# Patient Record
Sex: FEMALE | Race: WHITE | NOT HISPANIC OR LATINO | Employment: UNEMPLOYED | URBAN - METROPOLITAN AREA
[De-identification: names, ages, dates, MRNs, and addresses within clinical notes are randomized per-mention and may not be internally consistent; named-entity substitution may affect disease eponyms.]

---

## 2021-10-18 PROBLEM — M51.369 DEGENERATION OF LUMBAR INTERVERTEBRAL DISC: Status: ACTIVE | Noted: 2021-10-18

## 2024-08-05 ENCOUNTER — PREP FOR SURGERY (OUTPATIENT)
Dept: OTHER | Facility: HOSPITAL | Age: 44
End: 2024-08-05
Payer: COMMERCIAL

## 2024-08-05 ENCOUNTER — OFFICE VISIT (OUTPATIENT)
Dept: ORTHOPEDIC SURGERY | Facility: CLINIC | Age: 44
End: 2024-08-05
Payer: COMMERCIAL

## 2024-08-05 VITALS
DIASTOLIC BLOOD PRESSURE: 83 MMHG | HEART RATE: 95 BPM | WEIGHT: 150 LBS | HEIGHT: 64 IN | OXYGEN SATURATION: 97 % | BODY MASS INDEX: 25.61 KG/M2 | SYSTOLIC BLOOD PRESSURE: 127 MMHG

## 2024-08-05 DIAGNOSIS — M67.40 GANGLION CYST: ICD-10-CM

## 2024-08-05 DIAGNOSIS — M25.531 RIGHT WRIST PAIN: Primary | ICD-10-CM

## 2024-08-05 PROBLEM — M65.839 STENOSING TENOSYNOVITIS OF WRIST: Status: ACTIVE | Noted: 2024-08-05

## 2024-08-05 PROCEDURE — 99204 OFFICE O/P NEW MOD 45 MIN: CPT | Performed by: ORTHOPAEDIC SURGERY

## 2024-08-05 RX ORDER — ALBUTEROL SULFATE AND BUDESONIDE 90; 80 UG/1; UG/1
AEROSOL, METERED RESPIRATORY (INHALATION)
COMMUNITY
Start: 2024-05-15

## 2024-08-05 RX ORDER — CELECOXIB 100 MG/1
CAPSULE ORAL
COMMUNITY
Start: 2024-08-04

## 2024-08-05 NOTE — PROGRESS NOTES
"Chief Complaint  Initial Evaluation of the Right Wrist     Subjective      Shahida JIMENEZ presents to [facility] for initial evaluation of the right wrist.  She has a cyst on the right wrist.  She had no injury.  She has had the mass for 3 months.  She has pain with pronation and supination.  She had no injury or fall.      Allergies   Allergen Reactions    Morphine Itching     Widespread pruritis    Penicillins Other (See Comments)     Yeast infection         Social History     Socioeconomic History    Marital status: Legally    Tobacco Use    Smoking status: Every Day     Current packs/day: 1.00     Types: Cigarettes    Smokeless tobacco: Never    Tobacco comments:     vape   Vaping Use    Vaping status: Former   Substance and Sexual Activity    Alcohol use: Never    Drug use: Never    Sexual activity: Defer        I reviewed the patient's chief complaint, history of present illness, review of systems, past medical history, surgical history, family history, social history, medications, and allergy list.     Review of Systems     Constitutional: Denies fevers, chills, weight loss  Cardiovascular: Denies chest pain, shortness of breath  Skin: Denies rashes, acute skin changes  Neurologic: Denies headache, loss of consciousness      Vital Signs:   /83   Pulse 95   Ht 162.6 cm (64\")   Wt 68 kg (150 lb)   SpO2 97%   BMI 25.75 kg/m²          Physical Exam  General: Alert. No acute distress    Ortho Exam        RIGHT WRIST Negative Compression testing/ Negative Tinels. NegativeFinkelsteins. Positive Ortiz's testing. Negative CMC grind testing. Negative Phalens. Full ROM of the hand, fingers, elbow and wrist. Negative Triggering of the digit. Sensation grossly intact to light touch, median, radial and ulnar nerve. Positive AIN, PIN and ulnar nerve motor function intact. Axillary nerve intact. Positive pulses.  Ganglion cyst on the wrist.       Procedures        Imaging " Results (Most Recent)       None             Result Review :             Assessment and Plan     Diagnoses and all orders for this visit:    1. Right wrist pain (Primary)    2. Ganglion cyst right wrist        Discussed the treatment plan with the patient.     Discussed the treatment options with the patient, operative vs non-operative. The patient expressed understanding and wished to proceed with an excision of the ganglion cyst.        Educated on risk of smoking/nicotine. Discussed options for smoking cessation., Discussed surgery., Risks/benefits discussed with patient including, but not limited to: infection, bleeding, neurovascular damage, re-rupture, aesthetic deformity, need for further surgery, and death., Surgery pamphlet given., and Call or return if worsening symptoms.    Follow Up     2 weeks postoperatively       Patient was given instructions and counseling regarding her condition or for health maintenance advice. Please see specific information pulled into the AVS if appropriate.     Scribed for Cesar Adams MD by Sania Olguin MA.  08/05/24   08:29 EDT      I have personally performed the services described in this document as scribed by the above individual and it is both accurate and complete. Cesar Adams MD 08/05/24

## 2024-08-07 ENCOUNTER — PATIENT ROUNDING (BHMG ONLY) (OUTPATIENT)
Dept: ORTHOPEDIC SURGERY | Facility: CLINIC | Age: 44
End: 2024-08-07
Payer: COMMERCIAL

## 2024-08-07 NOTE — PROGRESS NOTES
A [employer] message has been sent to the patient for PATIENT R[place] with [facility] [place].

## 2024-08-14 ENCOUNTER — ANESTHESIA EVENT (OUTPATIENT)
Dept: PERIOP | Facility: HOSPITAL | Age: 44
End: 2024-08-14
Payer: COMMERCIAL

## 2024-08-14 NOTE — PRE-PROCEDURE INSTRUCTIONS
PATIENT INSTRUCTED TO BE:    - NOTHING TO EAT AFTER MIDNIGHT OR CHEW, EXCEPT CAN HAVE CLEAR LIQUIDS 2 HOURS PRIOR TO SURGERY ARRIVAL TIME , NO MORE THAN 8 OZ. (NOTHING RED)     - TO HOLD ALL VITAMINS, SUPPLEMENTS, NSAIDS FOR ONE WEEK PRIOR TO THEIR SURGICAL PROCEDURE         - BATHING INSTRUCTIONS GIVEN    INSTRUCTED NO LOTIONS, JEWELRY, PIERCINGS,  NAIL POLISH, OR DEODORANT DAY OF SURGERY      -INSTRUCTED TO TAKE THE FOLLOWING MEDICATIONS THE DAY OF SURGERY WITH SIPS OF WATER:   Methocarbamol, Cymbalta, Zyrtec, Protonix  Airsupra inhaler if needed        - DO NOT BRING ANY MEDICATIONS WITH YOU TO THE HOSPITAL THE DAY OF SURGERY, EXCEPT IF USE INHALERS. BRING INHALERS DAY OF SURGERY       - BRING CPAP OR BIPAP TO THE HOSPITAL ONLY IF YOU ARE SPENDING THE NIGHT    - DO NOT SMOKE OR VAPE 24 HOURS PRIOR TO PROCEDURE PER ANESTHESIA REQUEST     -MAKE SURE YOU HAVE A RIDE HOME OR SOMEONE TO STAY WITH YOU THE DAY OF THE PROCEDURE AFTER YOU GO HOME     - FOLLOW ANY OTHER INSTRUCTIONS GIVEN TO YOU BY YOUR SURGEON'S OFFICE.     COME TO [facility]/ [facility], [facility] FLOOR. CHECK IN AT THE DESK FOR REGISTRATION/SURGERY    - YOU WILL RECEIVE A PHONE CALL THE DAY PRIOR TO SURGERY BETWEEN 1PM AND 4 PM WITH ARRIVAL TIME, IF YOUR SURGERY IS ON A MONDAY YOU WILL RECEIVE A CALL THE FRIDAY PRIOR TO SURGERY DATE    - BRING CASH OR CREDIT CARD FOR COPAYMENT OF MEDICATIONS AFTER SURGERY IF YOU USE THE HOSPITAL PHARMACY ([facility] TO BED)    - PREADMISSION TESTING NURSE SERA -[phone] IF HAVE ANY QUESTIONS     -PATIENT PROVIDED THE NUMBER FOR PREOP SURGICAL DEPT IF HAD QUESTIONS AFTER HOURS PRIOR TO SURGERY -[phone]).  INFORMED PT IF NO ANSWER, LEAVE A MESSAGE AND SOMEONE WILL RETURN THEIR CALL       PATIENT VERBALIZED UNDERSTANDING       Clear Liquid Diet        Find out when you need to start a clear liquid diet.   Think of “clear liquids” as anything you could read a newspaper through. This includes things like water,  broth, sports drinks, or tea WITHOUT any kind of milk or cream.           Once you are told to start a clear liquid diet, only drink these things until 2 hours before arrival to the hospital or when the hospital says to stop. Total volume limitation: 8 oz.       Clear liquids you CAN drink:   Water   Clear broth: beef, chicken, vegetable, or bone broth with nothing in it   Gatorade   Lemonade or Vj-aid   Soda   Tea, coffee (NO cream or honey)   Jell-O (without fruit)   Popsicles (without fruit or cream)   Italian ices   Juice without pulp: apple, white, grape   You may use salt, pepper, and sugar  NO RED  NO NOODLES    Do NOT drink:   Milk or cream   Soy milk, almond milk, coconut milk, or other non-dairy drinks and   creamers   Milkshakes or smoothies   Tomato juice   Orange juice   Grapefruit juice   Cream soups or any other than broth         Clear Liquid Diet:  Do NOT eat any solid food.  Do NOT eat or suck on mints or candy.  Do NOT chew gum.  Do NOT drink thick liquids like milk or juice with pulp in it.  Do NOT add milk, cream, or anything like soy milk or almond milk to coffee or tea.

## 2024-08-15 ENCOUNTER — HOSPITAL ENCOUNTER (OUTPATIENT)
Facility: HOSPITAL | Age: 44
Discharge: HOME | End: 2024-08-15
Attending: ORTHOPAEDIC SURGERY | Admitting: ORTHOPAEDIC SURGERY
Payer: COMMERCIAL

## 2024-08-15 ENCOUNTER — ANESTHESIA (OUTPATIENT)
Dept: PERIOP | Facility: HOSPITAL | Age: 44
End: 2024-08-15
Payer: COMMERCIAL

## 2024-08-15 VITALS
OXYGEN SATURATION: 91 % | HEART RATE: 80 BPM | TEMPERATURE: 97.4 F | WEIGHT: 158.95 LBS | HEIGHT: 64 IN | BODY MASS INDEX: 27.14 KG/M2 | SYSTOLIC BLOOD PRESSURE: 111 MMHG | DIASTOLIC BLOOD PRESSURE: 67 MMHG | RESPIRATION RATE: 16 BRPM

## 2024-08-15 DIAGNOSIS — M65.839 STENOSING TENOSYNOVITIS OF WRIST: Primary | ICD-10-CM

## 2024-08-15 DIAGNOSIS — M25.531 RIGHT WRIST PAIN: ICD-10-CM

## 2024-08-15 PROCEDURE — 88304 TISSUE EXAM BY PATHOLOGIST: CPT | Performed by: ORTHOPAEDIC SURGERY

## 2024-08-15 PROCEDURE — 25010000002 MIDAZOLAM PER 1MG: Performed by: ANESTHESIOLOGY

## 2024-08-15 PROCEDURE — 25010000002 DEXAMETHASONE PER 1 MG: Performed by: NURSE ANESTHETIST, CERTIFIED REGISTERED

## 2024-08-15 PROCEDURE — 25010000002 ONDANSETRON PER 1 MG: Performed by: NURSE ANESTHETIST, CERTIFIED REGISTERED

## 2024-08-15 PROCEDURE — 25810000003 LACTATED RINGERS PER 1000 ML: Performed by: ANESTHESIOLOGY

## 2024-08-15 PROCEDURE — S0260 H&P FOR SURGERY: HCPCS | Performed by: ORTHOPAEDIC SURGERY

## 2024-08-15 PROCEDURE — 25010000002 FENTANYL CITRATE (PF) 50 MCG/ML SOLUTION: Performed by: NURSE ANESTHETIST, CERTIFIED REGISTERED

## 2024-08-15 PROCEDURE — 25000 INCISION OF TENDON SHEATH: CPT | Performed by: ORTHOPAEDIC SURGERY

## 2024-08-15 PROCEDURE — 25010000002 KETOROLAC TROMETHAMINE PER 15 MG: Performed by: NURSE ANESTHETIST, CERTIFIED REGISTERED

## 2024-08-15 PROCEDURE — 25010000002 CEFAZOLIN PER 500 MG: Performed by: ORTHOPAEDIC SURGERY

## 2024-08-15 PROCEDURE — 25111 REMOVE WRIST TENDON LESION: CPT | Performed by: ORTHOPAEDIC SURGERY

## 2024-08-15 PROCEDURE — 25010000002 MEPERIDINE PER 100 MG: Performed by: NURSE ANESTHETIST, CERTIFIED REGISTERED

## 2024-08-15 PROCEDURE — 25010000002 PROPOFOL 200 MG/20ML EMULSION: Performed by: NURSE ANESTHETIST, CERTIFIED REGISTERED

## 2024-08-15 RX ORDER — FENTANYL CITRATE 50 UG/ML
INJECTION, SOLUTION INTRAMUSCULAR; INTRAVENOUS AS NEEDED
Status: DISCONTINUED | OUTPATIENT
Start: 2024-08-15 | End: 2024-08-15 | Stop reason: SURG

## 2024-08-15 RX ORDER — ONDANSETRON 2 MG/ML
INJECTION INTRAMUSCULAR; INTRAVENOUS AS NEEDED
Status: DISCONTINUED | OUTPATIENT
Start: 2024-08-15 | End: 2024-08-15 | Stop reason: SURG

## 2024-08-15 RX ORDER — MEPERIDINE HYDROCHLORIDE 25 MG/ML
12.5 INJECTION INTRAMUSCULAR; INTRAVENOUS; SUBCUTANEOUS
Status: COMPLETED | OUTPATIENT
Start: 2024-08-15 | End: 2024-08-15

## 2024-08-15 RX ORDER — DEXAMETHASONE SODIUM PHOSPHATE 4 MG/ML
INJECTION, SOLUTION INTRA-ARTICULAR; INTRALESIONAL; INTRAMUSCULAR; INTRAVENOUS; SOFT TISSUE AS NEEDED
Status: DISCONTINUED | OUTPATIENT
Start: 2024-08-15 | End: 2024-08-15 | Stop reason: SURG

## 2024-08-15 RX ORDER — KETOROLAC TROMETHAMINE 15 MG/ML
INJECTION, SOLUTION INTRAMUSCULAR; INTRAVENOUS AS NEEDED
Status: DISCONTINUED | OUTPATIENT
Start: 2024-08-15 | End: 2024-08-15 | Stop reason: SURG

## 2024-08-15 RX ORDER — ONDANSETRON 2 MG/ML
4 INJECTION INTRAMUSCULAR; INTRAVENOUS ONCE AS NEEDED
Status: DISCONTINUED | OUTPATIENT
Start: 2024-08-15 | End: 2024-08-15 | Stop reason: HOSPADM

## 2024-08-15 RX ORDER — PROPOFOL 10 MG/ML
INJECTION, EMULSION INTRAVENOUS AS NEEDED
Status: DISCONTINUED | OUTPATIENT
Start: 2024-08-15 | End: 2024-08-15 | Stop reason: SURG

## 2024-08-15 RX ORDER — SODIUM CHLORIDE, SODIUM LACTATE, POTASSIUM CHLORIDE, CALCIUM CHLORIDE 600; 310; 30; 20 MG/100ML; MG/100ML; MG/100ML; MG/100ML
9 INJECTION, SOLUTION INTRAVENOUS CONTINUOUS PRN
Status: DISCONTINUED | OUTPATIENT
Start: 2024-08-15 | End: 2024-08-15 | Stop reason: HOSPADM

## 2024-08-15 RX ORDER — OXYCODONE HYDROCHLORIDE 5 MG/1
5 TABLET ORAL
Status: COMPLETED | OUTPATIENT
Start: 2024-08-15 | End: 2024-08-15

## 2024-08-15 RX ORDER — MIDAZOLAM HYDROCHLORIDE 2 MG/2ML
2 INJECTION, SOLUTION INTRAMUSCULAR; INTRAVENOUS ONCE
Status: COMPLETED | OUTPATIENT
Start: 2024-08-15 | End: 2024-08-15

## 2024-08-15 RX ORDER — PROMETHAZINE HYDROCHLORIDE 12.5 MG/1
25 TABLET ORAL ONCE AS NEEDED
Status: DISCONTINUED | OUTPATIENT
Start: 2024-08-15 | End: 2024-08-15 | Stop reason: HOSPADM

## 2024-08-15 RX ORDER — HYDROCODONE BITARTRATE AND ACETAMINOPHEN 7.5; 325 MG/1; MG/1
1 TABLET ORAL EVERY 4 HOURS PRN
Qty: 12 TABLET | Refills: 0 | Status: SHIPPED | OUTPATIENT
Start: 2024-08-15 | End: 2024-08-20 | Stop reason: SDUPTHER

## 2024-08-15 RX ORDER — LIDOCAINE HYDROCHLORIDE 20 MG/ML
INJECTION, SOLUTION EPIDURAL; INFILTRATION; INTRACAUDAL; PERINEURAL AS NEEDED
Status: DISCONTINUED | OUTPATIENT
Start: 2024-08-15 | End: 2024-08-15 | Stop reason: SURG

## 2024-08-15 RX ORDER — PROMETHAZINE HYDROCHLORIDE 25 MG/1
25 SUPPOSITORY RECTAL ONCE AS NEEDED
Status: DISCONTINUED | OUTPATIENT
Start: 2024-08-15 | End: 2024-08-15 | Stop reason: HOSPADM

## 2024-08-15 RX ORDER — ACETAMINOPHEN 500 MG
1000 TABLET ORAL ONCE
Status: COMPLETED | OUTPATIENT
Start: 2024-08-15 | End: 2024-08-15

## 2024-08-15 RX ADMIN — MEPERIDINE HYDROCHLORIDE 12.5 MG: 25 INJECTION INTRAMUSCULAR; INTRAVENOUS; SUBCUTANEOUS at 09:06

## 2024-08-15 RX ADMIN — OXYCODONE HYDROCHLORIDE 5 MG: 5 TABLET ORAL at 09:09

## 2024-08-15 RX ADMIN — MIDAZOLAM HYDROCHLORIDE 2 MG: 1 INJECTION, SOLUTION INTRAMUSCULAR; INTRAVENOUS at 08:10

## 2024-08-15 RX ADMIN — SODIUM CHLORIDE 2 G: 9 INJECTION, SOLUTION INTRAVENOUS at 08:26

## 2024-08-15 RX ADMIN — DEXAMETHASONE SODIUM PHOSPHATE 4 MG: 4 INJECTION, SOLUTION INTRAMUSCULAR; INTRAVENOUS at 08:32

## 2024-08-15 RX ADMIN — MEPERIDINE HYDROCHLORIDE 12.5 MG: 25 INJECTION INTRAMUSCULAR; INTRAVENOUS; SUBCUTANEOUS at 09:21

## 2024-08-15 RX ADMIN — ACETAMINOPHEN 1000 MG: 500 TABLET ORAL at 07:34

## 2024-08-15 RX ADMIN — ONDANSETRON 4 MG: 2 INJECTION INTRAMUSCULAR; INTRAVENOUS at 08:32

## 2024-08-15 RX ADMIN — FENTANYL CITRATE 50 MCG: 50 INJECTION, SOLUTION INTRAMUSCULAR; INTRAVENOUS at 08:41

## 2024-08-15 RX ADMIN — PROPOFOL 180 MG: 10 INJECTION, EMULSION INTRAVENOUS at 08:29

## 2024-08-15 RX ADMIN — OXYCODONE HYDROCHLORIDE 5 MG: 5 TABLET ORAL at 09:23

## 2024-08-15 RX ADMIN — FENTANYL CITRATE 50 MCG: 50 INJECTION, SOLUTION INTRAMUSCULAR; INTRAVENOUS at 08:29

## 2024-08-15 RX ADMIN — SODIUM CHLORIDE, POTASSIUM CHLORIDE, SODIUM LACTATE AND CALCIUM CHLORIDE 9 ML/HR: 600; 310; 30; 20 INJECTION, SOLUTION INTRAVENOUS at 07:34

## 2024-08-15 RX ADMIN — KETOROLAC TROMETHAMINE 15 MG: 15 INJECTION, SOLUTION INTRAMUSCULAR; INTRAVENOUS at 08:45

## 2024-08-15 RX ADMIN — LIDOCAINE HYDROCHLORIDE 100 MG: 20 INJECTION, SOLUTION EPIDURAL; INFILTRATION; INTRACAUDAL; PERINEURAL at 08:29

## 2024-08-15 NOTE — H&P
"Chief Complaint  Initial Evaluation of the Right Wrist        Subjective  Shahida JIMENEZ presents to [facility] for initial evaluation of the right wrist.  She has a cyst on the right wrist.  She had no injury.  She has had the mass for 3 months.  She has pain with pronation and supination.  She had no injury or fall.       Allergies         Allergies   Allergen Reactions    Morphine Itching       Widespread pruritis    Penicillins Other (See Comments)       Yeast infection             Social History   Social History            Socioeconomic History    Marital status: Legally    Tobacco Use    Smoking status: Every Day       Current packs/day: 1.00       Types: Cigarettes    Smokeless tobacco: Never    Tobacco comments:       vape   Vaping Use    Vaping status: Former   Substance and Sexual Activity    Alcohol use: Never    Drug use: Never    Sexual activity: Defer            I reviewed the patient's chief complaint, history of present illness, review of systems, past medical history, surgical history, family history, social history, medications, and allergy list.      Review of Systems      Constitutional: Denies fevers, chills, weight loss  Cardiovascular: Denies chest pain, shortness of breath  Skin: Denies rashes, acute skin changes  Neurologic: Denies headache, loss of consciousness        Vital Signs:   /83   Pulse 95   Ht 162.6 cm (64\")   Wt 68 kg (150 lb)   SpO2 97%   BMI 25.75 kg/m²           Physical Exam  General: Alert. No acute distress     Ortho Exam          RIGHT WRIST Negative Compression testing/ Negative Tinels. NegativeFinkelsteins. Positive Ortiz's testing. Negative CMC grind testing. Negative Phalens. Full ROM of the hand, fingers, elbow and wrist. Negative Triggering of the digit. Sensation grossly intact to light touch, median, radial and ulnar nerve. Positive AIN, PIN and ulnar nerve motor function intact. Axillary nerve intact. Positive " pulses.  Ganglion cyst on the wrist.         Procedures           Imaging Results (Most Recent)         None                      Result Review  :                  Assessment  Assessment and Plan      Diagnoses and all orders for this visit:     1. Right wrist pain (Primary)     2. Ganglion cyst right wrist           Discussed the treatment plan with the patient.      Discussed the treatment options with the patient, operative vs non-operative. The patient expressed understanding and wished to proceed with an excision of the ganglion cyst.          Educated on risk of smoking/nicotine. Discussed options for smoking cessation., Discussed surgery., Risks/benefits discussed with patient including, but not limited to: infection, bleeding, neurovascular damage, re-rupture, aesthetic deformity, need for further surgery, and death., Surgery pamphlet given., and Call or return if worsening symptoms.     Follow Up      2 weeks postoperatively

## 2024-08-15 NOTE — ANESTHESIA POSTPROCEDURE EVALUATION
Patient: Shahida JIMENEZ    Procedure Summary       Date: 08/15/24 Room / Location: [facility]  / [facility]    Anesthesia Start: 0826 Anesthesia Stop: 0853    Procedure: DEQUERVAIN RELEASE (Right: Hand) Diagnosis:       Right wrist pain      (Right wrist pain [M25.531])    Surgeons: Cesar Adams MD Provider: Colin Medellin MD    Anesthesia Type: general ASA Status: 2            Anesthesia Type: general    Vitals  Vitals Value Taken Time   /77 08/15/24 0907   Temp 36.2 °C (97.2 °F) 08/15/24 0907   Pulse 81 08/15/24 0919   Resp 16 08/15/24 0907   SpO2 92 % 08/15/24 0919   Vitals shown include unfiled device data.        Post Anesthesia Care and Evaluation    Patient location during evaluation: bedside  Patient participation: complete - patient participated  Level of consciousness: awake  Pain management: adequate    Airway patency: patent  PONV Status: none  Cardiovascular status: acceptable and stable  Respiratory status: acceptable  Hydration status: acceptable

## 2024-08-15 NOTE — H&P
"Chief Complaint  Initial Evaluation of the Right Wrist        Subjective  Shahida JIMENEZ presents to [facility] for initial evaluation of the right wrist.  She has a cyst on the right wrist.  She had no injury.  She has had the mass for 3 months.  She has pain with pronation and supination.  She had no injury or fall.       Allergies         Allergies   Allergen Reactions    Morphine Itching       Widespread pruritis    Penicillins Other (See Comments)       Yeast infection             Social History   Social History            Socioeconomic History    Marital status: Legally    Tobacco Use    Smoking status: Every Day       Current packs/day: 1.00       Types: Cigarettes    Smokeless tobacco: Never    Tobacco comments:       vape   Vaping Use    Vaping status: Former   Substance and Sexual Activity    Alcohol use: Never    Drug use: Never    Sexual activity: Defer            I reviewed the patient's chief complaint, history of present illness, review of systems, past medical history, surgical history, family history, social history, medications, and allergy list.      Review of Systems      Constitutional: Denies fevers, chills, weight loss  Cardiovascular: Denies chest pain, shortness of breath  Skin: Denies rashes, acute skin changes  Neurologic: Denies headache, loss of consciousness        Vital Signs:   /83   Pulse 95   Ht 162.6 cm (64\")   Wt 68 kg (150 lb)   SpO2 97%   BMI 25.75 kg/m²           Physical Exam  General: Alert. No acute distress     Ortho Exam          RIGHT WRIST Negative Compression testing/ Negative Tinels. NegativeFinkelsteins. Positive Ortiz's testing. Negative CMC grind testing. Negative Phalens. Full ROM of the hand, fingers, elbow and wrist. Negative Triggering of the digit. Sensation grossly intact to light touch, median, radial and ulnar nerve. Positive AIN, PIN and ulnar nerve motor function intact. Axillary nerve intact. Positive " pulses.  Ganglion cyst on the wrist.         Procedures           Imaging Results (Most Recent)         None                      Result Review  :                  Assessment  Assessment and Plan      Diagnoses and all orders for this visit:     1. Right wrist pain (Primary)     2. Ganglion cyst right wrist           Discussed the treatment plan with the patient.      Discussed the treatment options with the patient, operative vs non-operative. The patient expressed understanding and wished to proceed with an excision of the ganglion cyst.          Educated on risk of smoking/nicotine. Discussed options for smoking cessation., Discussed surgery., Risks/benefits discussed with patient including, but not limited to: infection, bleeding, neurovascular damage, re-rupture, aesthetic deformity, need for further surgery, and death., Surgery pamphlet given., and Call or return if worsening symptoms.     Follow Up      2 weeks postoperatively

## 2024-08-15 NOTE — ANESTHESIA PREPROCEDURE EVALUATION
Anesthesia Evaluation     Patient summary reviewed and Nursing notes reviewed   no history of anesthetic complications:   NPO Solid Status: > 8 hours  NPO Liquid Status: > 2 hours           Airway   Mallampati: II  TM distance: >3 FB  Neck ROM: full  No difficulty expected  Dental    (+) upper dentures    Pulmonary - normal exam    breath sounds clear to auscultation  (+) a smoker (1 ppd), COPD, asthma,  Cardiovascular - normal exam  Exercise tolerance: good (4-7 METS)    Rhythm: regular  Rate: normal    (+) hypertension, hyperlipidemia      Neuro/Psych  (+) psychiatric history Anxiety  GI/Hepatic/Renal/Endo    (+) GERD well controlled    Musculoskeletal     (+) back pain  Abdominal    Substance History - negative use     OB/GYN negative ob/gyn ROS         Other   arthritis,     ROS/Med Hx Other: PAT Nursing Notes unavailable.                Anesthesia Plan    ASA 2     general     (Patient understands anesthesia not responsible for dental damage.)  intravenous induction     Anesthetic plan, risks, benefits, and alternatives have been provided, discussed and informed consent has been obtained with: patient.    Use of blood products discussed with patient .    Plan discussed with CRNA.    CODE STATUS:

## 2024-08-15 NOTE — DISCHARGE INSTRUCTIONS
DISCHARGE INSTRUCTIONS        For your surgery you had:  General anesthesia (you may have a sore throat for the first 24 hours)  You may experience dizziness, drowsiness, or lightheadedness for several hours following surgery.  Do not stay alone today or tonight.  Limit your activity for 24 hours.  Resume your diet slowly.    You should not drive or operate machinery, drink alcohol, or sign legally binding documents for 24 hours or while you are taking pain medication.  . Protect the arm follow your physician's specific instructions. Carry the upper arm so that the hand and wrist are above the level of the heart. Elevate affected arm on a pillow when resting.   Use ice to affected area for 48-72 hours. Apply 20 minutes on - 20 minutes off. Do NOT apply directly to skin.  Exercise fingers frequently by making a full fist and fully straightening the fingers. This will help prevent swelling and stiffness.  Do NOT do any heavy lifting, pulling or strenuous activities using the affected hand. [] Keep splint clean and dry.  []    [] You may shower or bathe today as long as keep dressing dry and intact wrap dressing with plastic wrap of bag to keep dressing dry may wash fingers     .  NOTIFY YOUR DOCTOR IF YOU EXPERIENCE ANY OF THE FOLLOWING:  Temperature greater than 101 degrees Fahrenheit  Shaking Chills  Redness or excessive drainage from incision  Nausea, vomiting and/or pain that is not controlled by prescribed medications  Increase in bleeding or bleeding that is excessive  Unable to urinate in 6 hours after surgery  If unable to reach your doctor, please go to the closest Emergency Room  Last dose of pain medication was given at:      SPECIAL INSTRUCTIONS:  May take an over the counter stool softener as needed   Please move finger frequently open and closing fingers make sure to move the thumb   Should have plenty of give in the dressing if dressing cuts into hand make sure hand is elevated if arm elevated then  call dr office          I have read and received the above instructions.

## 2024-08-15 NOTE — OP NOTE
DEQUERVAIN RELEASE  Procedure Report    Patient Name:  Shahida JIMENEZ  YOB: 1980    Date of Surgery:  8/15/2024     Indications: H&P    Pre-op Diagnosis:   Right wrist pain [M25.531]     Right wrist de Quervain's  Post-Op Diagnosis Codes:     * Right wrist pain [M25.531]  Same  Procedure/CPT® Codes:      Procedure(s):  Right DEQUERVAIN RELEASE and cyst excision          Staff:  Surgeon(s):  Cesar Adams MD    Assistant: Kenyatta Webb    Anesthesia: Choice    Estimated Blood Loss: none    Implants:    Nothing was implanted during the procedure    Specimen:          Specimens       ID Source Type Tests Collected By Collected At Frozen?    A Wrist, Right Tissue TISSUE PATHOLOGY EXAM   Cesar Adams MD 8/15/24 0839     Description: right wrist cyst                Findings: See description    Complications: None    Description of Procedure: Patient was taken operating room placed supine operating table after general anesthesia established right wrist and upper extremities prepped and draped in standard surgical fashion used alcohol ChloraPrep.  Diagonal incision was made with the first dorsal wrist compartment dissection was carried down to the cyst over the first wrist compartment and it was excised passed off the specimen and the first dorsal wrist compartment was released the tendons were pulled free there is no deep attachments the wound was copiously irrigated and closed with 4-0 nylon the incision was washed and dried and sterile dressings were applied patient tolerated the procedure well was taken recovery.                  Cesar Adams MD     Date: 8/15/2024  Time: 08:48 EDT

## 2024-08-16 ENCOUNTER — TELEPHONE (OUTPATIENT)
Dept: ORTHOPEDIC SURGERY | Facility: CLINIC | Age: 44
End: 2024-08-16
Payer: COMMERCIAL

## 2024-08-16 LAB
CYTO UR: NORMAL
LAB AP CASE REPORT: NORMAL
LAB AP CLINICAL INFORMATION: NORMAL
PATH REPORT.FINAL DX SPEC: NORMAL
PATH REPORT.GROSS SPEC: NORMAL

## 2024-08-16 NOTE — TELEPHONE ENCOUNTER
Caller: FRANC JIMENEZ    Phone Number: [phone] (home)     Reason for Call:   PATIENT IS REQUESTING WE SEND PRESCRIPTION FOR SLING FOR RIGHT WRIST TO HER PHARMACY ON FILE ([id number]) DUE TO NOT GETTING ONE YESTERDAY AFTER SURGERY.

## 2024-08-16 NOTE — TELEPHONE ENCOUNTER
Called patient and explained that we do not want her in a sling for this surgery as we want her to keep her elbow mobile.  She voiced understanding.

## 2024-08-20 DIAGNOSIS — M65.839 STENOSING TENOSYNOVITIS OF WRIST: ICD-10-CM

## 2024-08-21 RX ORDER — HYDROCODONE BITARTRATE AND ACETAMINOPHEN 7.5; 325 MG/1; MG/1
1 TABLET ORAL EVERY 6 HOURS PRN
Qty: 28 TABLET | Refills: 0 | Status: SHIPPED | OUTPATIENT
Start: 2024-08-21

## 2024-08-28 ENCOUNTER — OFFICE VISIT (OUTPATIENT)
Dept: ORTHOPEDIC SURGERY | Facility: CLINIC | Age: 44
End: 2024-08-28
Payer: COMMERCIAL

## 2024-08-28 VITALS
HEART RATE: 101 BPM | DIASTOLIC BLOOD PRESSURE: 84 MMHG | BODY MASS INDEX: 26.98 KG/M2 | HEIGHT: 64 IN | SYSTOLIC BLOOD PRESSURE: 117 MMHG | OXYGEN SATURATION: 97 % | WEIGHT: 158 LBS

## 2024-08-28 DIAGNOSIS — Z47.89 AFTERCARE FOLLOWING SURGERY OF THE MUSCULOSKELETAL SYSTEM: Primary | ICD-10-CM

## 2024-08-28 DIAGNOSIS — M25.531 RIGHT WRIST PAIN: Primary | ICD-10-CM

## 2024-08-28 DIAGNOSIS — Z47.89 AFTERCARE FOLLOWING SURGERY OF THE MUSCULOSKELETAL SYSTEM: ICD-10-CM

## 2024-08-28 PROCEDURE — 99024 POSTOP FOLLOW-UP VISIT: CPT

## 2024-08-28 RX ORDER — HYDROCODONE BITARTRATE AND ACETAMINOPHEN 5; 325 MG/1; MG/1
1 TABLET ORAL EVERY 6 HOURS PRN
Qty: 28 TABLET | Refills: 0 | Status: SHIPPED | OUTPATIENT
Start: 2024-08-28

## 2024-08-28 NOTE — PATIENT INSTRUCTIONS
Sutures removed in office and Steri-Strips applied.  Patient educated on incision care.  Please keep incision clean and dry.  Do not soak or submerge in water until incision is fully healed.  Do not apply creams or lotions over the incision. Continue icing and elevation as needed to help with pain and swelling.  Ice up to 3 or 4 times daily for no longer than 15 to 20 minutes at a time.  Educated that Steri-Strips will stay on 7 to 10 days.  After 10 days she may remove the Steri-Strips if they have not fallen off on their own.    Patient advised to return to a brace with activities.  Avoid repetitive ROM of the hand or wrist.  No heavy lifting, pushing, or pulling until incision is fully healed.  Home exercises provided today.   Order placed to begin OT with [facility].    Follow-up in 4 wks.  No imaging.  Please call with questions or concerns.

## 2024-08-28 NOTE — PROGRESS NOTES
"Chief Complaint  Follow-up of the Right Wrist    Subjective      Shahida Shabazz presents to [facility] for 2-week follow-up of right de Quervain release and cyst excision with Dr. Adams on 8/15/2024.  Patient presents today for suture removal.  She reports that she is taking her pain medication every 6 hours but is trying to wean off of it.  She continues to have pain in the wrist, especially with extension of the thumb.  She is here today for follow-up.    Objective   Allergies   Allergen Reactions    Morphine Itching     Widespread pruritis    Penicillins Other (See Comments)     Yeast infection        Vital Signs:   /84   Pulse 101   Ht 162.6 cm (64.02\")   Wt 71.7 kg (158 lb)   SpO2 97%   BMI 27.11 kg/m²       Physical Exam    Constitutional: Awake, alert. Well nourished appearance.    Integumentary: Warm, dry, intact. No obvious rashes.    HENT: Atraumatic, normocephalic.   Respiratory: Non labored respirations .   Cardiovascular: Intact peripheral pulses.    Psychiatric: Normal mood and affect. A&O X3    Ortho Exam  Right wrist: Limited range of motion with wrist flexion, extension, supination pronation.  She is able to make a fist.  Thumb to finger opposition is intact.  CRT is brisk.  Sensation is intact.  Incision is well-approximated healing appropriately.  There is no redness, drainage or tenderness to touch.  Pain elicited with flexion and extension of the thumb at the surgical site.    Imaging Results (Most Recent)       None                      Assessment and Plan   Problem List Items Addressed This Visit    None  Visit Diagnoses       Aftercare following dequarvains release    -  Primary    Relevant Orders    Ambulatory Referral to Occupational Therapy for Evaluation & Treatment            Follow Up   Return in about 4 weeks (around 9/25/2024).    Patient is a smoker, please discuss smoking cessation options with your PCP.    Social History     Socioeconomic " History    Marital status:    Tobacco Use    Smoking status: Every Day     Current packs/day: 1.00     Types: Cigarettes    Smokeless tobacco: Never    Tobacco comments:     vape   Vaping Use    Vaping status: Former   Substance and Sexual Activity    Alcohol use: Never    Drug use: Never    Sexual activity: Defer       Patient Instructions   Sutures removed in office and Steri-Strips applied.  Patient educated on incision care.  Please keep incision clean and dry.  Do not soak or submerge in water until incision is fully healed.  Do not apply creams or lotions over the incision. Continue icing and elevation as needed to help with pain and swelling.  Ice up to 3 or 4 times daily for no longer than 15 to 20 minutes at a time.  Educated that Steri-Strips will stay on 7 to 10 days.  After 10 days she may remove the Steri-Strips if they have not fallen off on their own.    Patient advised to return to a brace with activities.  Avoid repetitive ROM of the hand or wrist.  No heavy lifting, pushing, or pulling until incision is fully healed.  Home exercises provided today.   Order placed to begin OT with [facility].    Follow-up in 4 wks.  No imaging.  Please call with questions or concerns.      Patient was given instructions and counseling regarding her condition or for health maintenance advice. Please see specific information pulled into the AVS if appropriate.

## 2024-09-03 ENCOUNTER — TREATMENT (OUTPATIENT)
Dept: PHYSICAL THERAPY | Facility: CLINIC | Age: 44
End: 2024-09-03
Payer: COMMERCIAL

## 2024-09-03 DIAGNOSIS — M65.4 DE QUERVAIN'S TENOSYNOVITIS: Primary | ICD-10-CM

## 2024-09-03 DIAGNOSIS — M25.841 CYST OF JOINT OF HAND, RIGHT: ICD-10-CM

## 2024-09-03 PROCEDURE — 97110 THERAPEUTIC EXERCISES: CPT | Performed by: OCCUPATIONAL THERAPIST

## 2024-09-03 PROCEDURE — 97165 OT EVAL LOW COMPLEX 30 MIN: CPT | Performed by: OCCUPATIONAL THERAPIST

## 2024-09-03 PROCEDURE — 97112 NEUROMUSCULAR REEDUCATION: CPT | Performed by: OCCUPATIONAL THERAPIST

## 2024-09-03 NOTE — PROGRESS NOTES
Outpatient Occupational Therapy Ortho Initial Evaluation  [address] [place] [address]    Patient: Shahida Shabazz   : 1980  Referring practitioner: ELAINE Peña  Date of Initial Visit: 9/3/2024  Today's Date: 9/3/2024  Patient seen for 1 sessions  Diagnosis/ICD-10 Code:      ICD-10-CM ICD-9-CM   1. De Quervain's tenosynovitis  M65.4 727.04   2. Cyst of joint of hand, right  M25.841 719.84                Subjective Questionnaire: QuickDASH: 49      Subjective Evaluation    History of Present Illness  Date of onset: 2024  Date of surgery: 8/15/2024  Mechanism of injury: In the beginning of May began to have pain, progressively worsening with movement.  Pt reports 8/15/24 Dequervains and Cyst removal of R wrist.       Patient Occupation:  for [employer]   Precautions and Work Restrictions: off workQuality of life: excellent    Pain  Current pain ratin  At worst pain ratin  Location: R wrist  Quality: tight and throbbing  Relieving factors: change in position  Aggravating factors: lifting and repetitive movement (driving, cooking)  Progression: improved    Social Support  Lives in: one-story house  Lives with: roommate.    Hand dominance: right    Diagnostic Tests  X-ray: normal    Treatments  Previous treatment: immobilization  Patient Goals  Patient goals for therapy: decreased pain, increased motion, decreased edema, return to work, independence with ADLs/IADLs and increased strength           Past Medical hx: no significant medical hx    Objective          Neurological Testing     Sensation     Wrist/Hand     Right   Intact: light touch    Comments   Right light touch: 2.83 SW thumb and radial nerve path    Active Range of Motion     Right Wrist   Wrist flexion: 55 degrees   Wrist extension: 50 degrees   Radial deviation: 15 degrees   Ulnar deviation: 15 degrees     Additional Active Range of Motion Details  Pro/sup WFL but stiff at end range     R thumb   Ext  50  ABD 45    R thumb   0-50  0-55    Strength/Myotome Testing     Left Wrist/Hand      (2nd hand position)   Left  strength (2nd hand position) 55 lbs    Right Wrist/Hand      (2nd hand position)   Right  strength (2nd hand position) 20 lbs    Swelling     Right Wrist/Hand   Thumb     Distal: 6 cm  Circumference MCP: 18 cm  Circumference wrist: 16 cm          Assessment & Plan       Assessment  Impairments: abnormal coordination, abnormal muscle firing, abnormal or restricted ROM, activity intolerance, impaired physical strength, lacks appropriate home exercise program, pain with function, safety issue and weight-bearing intolerance   Functional limitations: carrying objects, lifting, pulling, pushing, reaching behind back, reaching overhead and unable to perform repetitive tasks   Assessment details: Pt presents with thumb spica  brace on R wrist s/p 1sr dorsal compartment release and cyst removal. Pt reports no pain at rest, pain 7/10 with movement.  Pt reports unable to functionally use R hand for any task currently.   Having difficulty with lifting and gripping. Pt currently off work.   Prognosis: good    Goals  Plan Goals: 1. The patient complains of pain in the R wrist                  LTG 1: 12 weeks:  The patient will report a pain rating of 0/10 or better in order to improve sleep quality and tolerance to performance of activities of daily living.                                  STATUS:  New                  STG 1a: 4weeks:  The patient will report a pain rating of 4/10 or better.                                   STATUS:  New  2. The patient has limited ROM of the R wrist                   LTG 2: 12 weeks:  The patient will demonstrate 140 degrees of R wrist MONSIVAIS to allow the patient to WB.                                  STATUS:  New                   STG 2a: 4 weeks:  The patient will demonstrate 110 degrees of R wrist MONSIVAIS.                                  STATUS:  New                              3. The patient has limited strength of the R UE.                  LTG 3: 12 weeks:  The patient will demonstrate 50# in order to return to work .                                  STATUS:  New                  STG 3a: 4 weeks:  The patient will demonstrate tolerance to light strengthening without adverse reaction.                                  STATUS:  New  4. Carrying, Moving, and Handling Objects Functional Limitation                                    LTG 4: 12 weeks:  The patient will demonstrate 0% limitation by achieving a score of 11 on the Quick DASH.                                  STATUS:  New                  STG 4a: 4 weeks:  The patient will demonstrate 60-79% limitation by achieving a score of 38 on the Quick DASH.                                    STATUS:  New                    TREATMENT: Orthotic fabrication/fitting/management and training, Manual therapy, therapeutic exercise, home exercise instruction, and modalities as needed to include: electrical stimulation, ultrasound, moist heat, paraffin, fluidotherapy, dry needling, and ice.       Plan  Planned modality interventions: TENS, thermotherapy (hydrocollator packs), thermotherapy (paraffin bath), ultrasound and electrical stimulation/Russian stimulation  Other planned modality interventions: fluidotherapy, dry needling  Planned therapy interventions: manual therapy, ADL retraining, motor coordination training, neuromuscular re-education, soft tissue mobilization, fine motor coordination training, body mechanics training, balance/weight-bearing training, functional ROM exercises, flexibility, spinal/joint mobilization, strengthening, stretching, therapeutic activities, IADL retraining, joint mobilization and home exercise program  Frequency: 2x week  Duration in weeks: 12  Treatment plan discussed with: patient        Patient is indicated for skilled occupational therapy services.    History # of Personal Factors and/or  Comorbidities: LOW (0)  Examination of Body System(s): # of elements: LOW (1-2)  Clinical Presentation: EVOLVING  Clinical Decision Making: LOW      Evaluation:  Low Complexity:    20     mins  85590;  Mod Complexity:    0     mins  00462;  High Complexity:    0     mins  36232;    Timed:  Manual Therapy:    5     mins  46323;  Therapeutic Exercise:    10     mins  40848;  Therapeutic Activity:    0     mins  52929;     Neuromuscular Jonathan:    10    mins  55306;    Ultrasound:     0     mins  83705;    Electrical Stimulation:    0     mins  85885;    Untimed:  Electrical Stimulation:    0     mins  19177 ( );  Fluidotherapy:        0    mins  43234  Dry Needlin    mins  48670    Timed Treatment:   25   mins   Total Treatment:     45   mins      OT SIGNATURE: JAEL Spear, OTR/L, CHT     Electronically signed    [place] LICENSE: [id number]   DATE TREATMENT INITIATED: 9/3/2024    Initial Certification  Certification Period: 9/3/2024 thru 2024  I certify that the therapy services are furnished while this patient is under my care.  The services outlined above are required by this patient, and will be reviewed every 90 days.     Signature:______________________________________________ PHYSICIAN:  Poonam Mcnamara APRN   NPI: [id number]                                      DATE:    Please sign and return via fax to [phone]   Thank you, [facility].

## 2024-09-05 ENCOUNTER — TREATMENT (OUTPATIENT)
Dept: PHYSICAL THERAPY | Facility: CLINIC | Age: 44
End: 2024-09-05
Payer: COMMERCIAL

## 2024-09-05 DIAGNOSIS — M25.841 CYST OF JOINT OF HAND, RIGHT: ICD-10-CM

## 2024-09-05 DIAGNOSIS — M65.4 DE QUERVAIN'S TENOSYNOVITIS: Primary | ICD-10-CM

## 2024-09-05 NOTE — PROGRESS NOTES
Occupational Therapy Daily Treatment Note   [address] [place] [address]    Patient: Shahida Shabazz   : 1980  Referring practitioner: ELAINE Peña  Date of Initial Visit: Type: THERAPY  Noted: 9/3/2024  Today's Date: 2024  Patient seen for 2 sessions    ICD-10-CM ICD-9-CM   1. De Quervain's tenosynovitis  M65.4 727.04   2. Cyst of joint of hand, right  M25.841 719.84          Shahida Shabazz reports I haven't been wearing my brace and using my R hand.       Functional status Brushing hair and pulling hair up in pony tail.  Using TV remote with R hand.    Objective   See Exercise, Manual, and Modality Logs for complete treatment.   Continues to have tightness with end range flexion and UD.  Tight along extensors this date.     Assessment/Plan    Pt progressing with AROM in R wrist.  Scar tenderness is improving.      Cont per POC           Timed:  Manual Therapy:    0     mins  09505;  Therapeutic Exercise:    10     mins  84912;  Therapeutic Activity:    10     mins  76066;     Neuromuscular Jonathan:    10    mins  01475;    Ultrasound:     0     mins  85118;    Electrical Stimulation:    0     mins  04209;    Untimed:  Electrical Stimulation:    0     mins  30861 ( );  Fluidotherapy:        0    mins  34359  Dry Needlin    mins      Timed Treatment:   30   mins   Total Treatment:     30   mins    OT SIGNATURE: JAEL Spear, OTR/L, CHT     Electronically signed    [place] LICENSE: [id number]

## 2024-09-10 ENCOUNTER — TREATMENT (OUTPATIENT)
Dept: PHYSICAL THERAPY | Facility: CLINIC | Age: 44
End: 2024-09-10
Payer: COMMERCIAL

## 2024-09-10 DIAGNOSIS — M65.4 DE QUERVAIN'S TENOSYNOVITIS: Primary | ICD-10-CM

## 2024-09-10 DIAGNOSIS — M25.841 CYST OF JOINT OF HAND, RIGHT: ICD-10-CM

## 2024-09-10 PROCEDURE — 97110 THERAPEUTIC EXERCISES: CPT | Performed by: OCCUPATIONAL THERAPIST

## 2024-09-10 PROCEDURE — 97530 THERAPEUTIC ACTIVITIES: CPT | Performed by: OCCUPATIONAL THERAPIST

## 2024-09-10 NOTE — PROGRESS NOTES
Occupational Therapy Daily Treatment Note   [address] [place] [address]    Patient: Shahida Shabazz   : 1980  Referring practitioner: ELAINE Peña  Date of Initial Visit: Type: THERAPY  Noted: 9/3/2024  Today's Date: 9/10/2024  Patient seen for 3 sessions    ICD-10-CM ICD-9-CM   1. De Quervain's tenosynovitis  M65.4 727.04   2. Cyst of joint of hand, right  M25.841 719.84          Shahida Shabazz reports I brushed my hair and loaded the  back to back on Saturday and have been hurting ever since.  7/10 pain.       Functional status lifting purse causing pain today.    Objective   See Exercise, Manual, and Modality Logs for complete treatment.   Right Wrist   Wrist flexion: 80 degrees   Wrist extension: 65 degrees   Radial deviation: 20 degrees   Ulnar deviation: 30 degrees     Assessment/Plan  Cont with AROM and desensitization.  Recommend holding lifting at this time until pain decreases.      Cont per POC           Timed:  Manual Therapy:    0     mins  91103;  Therapeutic Exercise:    10     mins  73983;  Therapeutic Activity:    10     mins  12983;     Neuromuscular Jonathan:    10    mins  46018;    Ultrasound:     0     mins  37027;    Electrical Stimulation:    0     mins  02424;    Untimed:  Electrical Stimulation:    0     mins  42133 ( );  Fluidotherapy:        0    mins  15421  Dry Needlin    mins      Timed Treatment:   30   mins   Total Treatment:     30   mins    OT SIGNATURE: JAEL pSear, OTR/L, CHT     Electronically signed    [place] LICENSE: [id number]

## 2024-09-12 ENCOUNTER — TREATMENT (OUTPATIENT)
Dept: PHYSICAL THERAPY | Facility: CLINIC | Age: 44
End: 2024-09-12
Payer: COMMERCIAL

## 2024-09-12 DIAGNOSIS — M25.841 CYST OF JOINT OF HAND, RIGHT: ICD-10-CM

## 2024-09-12 DIAGNOSIS — M65.4 DE QUERVAIN'S TENOSYNOVITIS: Primary | ICD-10-CM

## 2024-09-12 NOTE — PROGRESS NOTES
Occupational Therapy Daily Treatment Note   [address] [place] [address]    Patient: Shhaida Shabazz   : 1980  Referring practitioner: ELAINE Peña  Date of Initial Visit: Type: THERAPY  Noted: 9/3/2024  Today's Date: 2024  Patient seen for 4 sessions    ICD-10-CM ICD-9-CM   1. De Quervain's tenosynovitis  M65.4 727.04   2. Cyst of joint of hand, right  M25.841 719.84          Shahida Shabazz reports I am ok today.  5/10 pain       Functional status Reports functionally about the same, still avoiding doing the dishes     Objective   See Exercise, Manual, and Modality Logs for complete treatment.   Added fine motor control     Assessment/Plan    Progress to strengthening when pain is decreased to 2-3/10    Cont per POC           Timed:  Manual Therapy:    0     mins  46922;  Therapeutic Exercise:    10     mins  93197;  Therapeutic Activity:    10     mins  35469;     Neuromuscular Jonathan:    10    mins  18720;    Ultrasound:     0     mins  83836;    Electrical Stimulation:    0     mins  21631;    Untimed:  Electrical Stimulation:    0     mins  93310 ( );  Fluidotherapy:        0    mins  10181  Dry Needlin    mins      Timed Treatment:   40   mins   Total Treatment:     40   mins    OT SIGNATURE: JAEL Spear, OTR/L, CHT     Electronically signed    [place] LICENSE: [id number]

## 2024-09-17 ENCOUNTER — TREATMENT (OUTPATIENT)
Dept: PHYSICAL THERAPY | Facility: CLINIC | Age: 44
End: 2024-09-17
Payer: COMMERCIAL

## 2024-09-17 DIAGNOSIS — M65.4 DE QUERVAIN'S TENOSYNOVITIS: Primary | ICD-10-CM

## 2024-09-17 DIAGNOSIS — M25.841 CYST OF JOINT OF HAND, RIGHT: ICD-10-CM

## 2024-09-17 PROCEDURE — 97530 THERAPEUTIC ACTIVITIES: CPT | Performed by: OCCUPATIONAL THERAPIST

## 2024-09-17 PROCEDURE — 97112 NEUROMUSCULAR REEDUCATION: CPT | Performed by: OCCUPATIONAL THERAPIST

## 2024-09-19 ENCOUNTER — TREATMENT (OUTPATIENT)
Dept: PHYSICAL THERAPY | Facility: CLINIC | Age: 44
End: 2024-09-19
Payer: COMMERCIAL

## 2024-09-19 DIAGNOSIS — M65.4 DE QUERVAIN'S TENOSYNOVITIS: Primary | ICD-10-CM

## 2024-09-19 DIAGNOSIS — M25.841 CYST OF JOINT OF HAND, RIGHT: ICD-10-CM

## 2024-09-24 ENCOUNTER — TREATMENT (OUTPATIENT)
Dept: PHYSICAL THERAPY | Facility: CLINIC | Age: 44
End: 2024-09-24
Payer: COMMERCIAL

## 2024-09-24 DIAGNOSIS — M25.841 CYST OF JOINT OF HAND, RIGHT: ICD-10-CM

## 2024-09-24 DIAGNOSIS — M65.4 DE QUERVAIN'S TENOSYNOVITIS: Primary | ICD-10-CM

## 2024-09-24 PROCEDURE — 97530 THERAPEUTIC ACTIVITIES: CPT | Performed by: OCCUPATIONAL THERAPIST

## 2024-09-24 PROCEDURE — 97112 NEUROMUSCULAR REEDUCATION: CPT | Performed by: OCCUPATIONAL THERAPIST

## 2024-09-25 ENCOUNTER — OFFICE VISIT (OUTPATIENT)
Dept: ORTHOPEDIC SURGERY | Facility: CLINIC | Age: 44
End: 2024-09-25
Payer: COMMERCIAL

## 2024-09-25 VITALS
HEART RATE: 93 BPM | WEIGHT: 158 LBS | OXYGEN SATURATION: 96 % | DIASTOLIC BLOOD PRESSURE: 71 MMHG | HEIGHT: 64 IN | SYSTOLIC BLOOD PRESSURE: 121 MMHG | BODY MASS INDEX: 26.98 KG/M2

## 2024-09-25 DIAGNOSIS — Z47.89 AFTERCARE FOLLOWING SURGERY OF THE MUSCULOSKELETAL SYSTEM: Primary | ICD-10-CM

## 2024-09-25 PROCEDURE — 99024 POSTOP FOLLOW-UP VISIT: CPT

## 2024-09-26 ENCOUNTER — TREATMENT (OUTPATIENT)
Dept: PHYSICAL THERAPY | Facility: CLINIC | Age: 44
End: 2024-09-26
Payer: COMMERCIAL

## 2024-09-26 DIAGNOSIS — M65.4 DE QUERVAIN'S TENOSYNOVITIS: Primary | ICD-10-CM

## 2024-09-26 DIAGNOSIS — M25.841 CYST OF JOINT OF HAND, RIGHT: ICD-10-CM

## 2024-10-01 ENCOUNTER — TREATMENT (OUTPATIENT)
Dept: PHYSICAL THERAPY | Facility: CLINIC | Age: 44
End: 2024-10-01
Payer: COMMERCIAL

## 2024-10-01 DIAGNOSIS — M25.841 CYST OF JOINT OF HAND, RIGHT: ICD-10-CM

## 2024-10-01 DIAGNOSIS — M65.4 DE QUERVAIN'S TENOSYNOVITIS: Primary | ICD-10-CM

## 2024-10-01 PROCEDURE — 97112 NEUROMUSCULAR REEDUCATION: CPT | Performed by: OCCUPATIONAL THERAPIST

## 2024-10-01 PROCEDURE — 97530 THERAPEUTIC ACTIVITIES: CPT | Performed by: OCCUPATIONAL THERAPIST

## 2024-10-01 NOTE — PROGRESS NOTES
Occupational Therapy Daily Treatment Note   [address] [place] [address]    Patient: Shahida Shabazz   : 1980  Referring practitioner: ELAINE Peña  Date of Initial Visit: Type: THERAPY  Noted: 9/3/2024  Today's Date: 10/1/2024  Patient seen for 9 sessions    ICD-10-CM ICD-9-CM   1. De Quervain's tenosynovitis  M65.4 727.04   2. Cyst of joint of hand, right  M25.841 719.84          Shahida Shabazz reports Went back to work yesterday.  I followed doctor's orders.  Currently 1/10 pain       Functional status still using brace when lifting parts at work.    Objective   See Exercise, Manual, and Modality Logs for complete treatment.   Progressed resistance and repetitions this date with good tolerance.     Assessment/Plan    Progress strengthening as tolerated.    Cont per POC           Timed:  Manual Therapy:    0     mins  96328;  Therapeutic Exercise:    10     mins  22249;  Therapeutic Activity:    10     mins  66255;     Neuromuscular Jonathan:    10    mins  32209;    Ultrasound:     0     mins  76172;    Electrical Stimulation:    0     mins  65948;    Untimed:  Electrical Stimulation:    0     mins  60922 ( );  Fluidotherapy:        0    mins  86633  Dry Needlin    mins  93512    Timed Treatment:   30   mins   Total Treatment:     30   mins    OT SIGNATURE: JAEL Spear, OTR/L, CHT     Electronically signed    [place] LICENSE: [id number]

## 2024-10-03 ENCOUNTER — TREATMENT (OUTPATIENT)
Dept: PHYSICAL THERAPY | Facility: CLINIC | Age: 44
End: 2024-10-03
Payer: COMMERCIAL

## 2024-10-03 DIAGNOSIS — M25.841 CYST OF JOINT OF HAND, RIGHT: ICD-10-CM

## 2024-10-03 DIAGNOSIS — M65.4 DE QUERVAIN'S TENOSYNOVITIS: Primary | ICD-10-CM

## 2024-10-03 NOTE — PROGRESS NOTES
Occupational Therapy Daily Treatment Note   [address] [place] [address]    Patient: Shahida Shabazz   : 1980  Referring practitioner: ELAINE Peña  Date of Initial Visit: Type: THERAPY  Noted: 9/3/2024  Today's Date: 10/3/2024  Patient seen for 10 sessions    ICD-10-CM ICD-9-CM   1. De Quervain's tenosynovitis  M65.4 727.04   2. Cyst of joint of hand, right  M25.841 719.84          Shahida Shabazz reports I am sore from returning to work, but so is my whole body.      Functional status doing more lifting at work, still getting assistance with heavier objects.     Objective   See Exercise, Manual, and Modality Logs for complete treatment.   Progressed strengthening in R hand as tolerated.     Assessment/Plan  Cont skilled OT for strengthening in R hand to progress with lifting and pushing and pulling.       Cont per POC           Timed:  Manual Therapy:    0     mins  68013;  Therapeutic Exercise:    10     mins  53998;  Therapeutic Activity:    10     mins  11812;     Neuromuscular Jonathan:    10    mins  20106;    Ultrasound:     0     mins  92825;    Electrical Stimulation:    0     mins  40305;    Untimed:  Electrical Stimulation:    0     mins  93140 ( );  Fluidotherapy:        0    mins  32008  Dry Needlin    mins      Timed Treatment:   30   mins   Total Treatment:     30   mins    OT SIGNATURE: JAEL Spear, OTR/L, CHT     Electronically signed    [place] LICENSE: [id number]

## 2024-10-08 ENCOUNTER — TREATMENT (OUTPATIENT)
Dept: PHYSICAL THERAPY | Facility: CLINIC | Age: 44
End: 2024-10-08
Payer: COMMERCIAL

## 2024-10-08 DIAGNOSIS — M65.4 DE QUERVAIN'S TENOSYNOVITIS: Primary | ICD-10-CM

## 2024-10-08 DIAGNOSIS — M25.841 CYST OF JOINT OF HAND, RIGHT: ICD-10-CM

## 2024-10-08 PROCEDURE — 97530 THERAPEUTIC ACTIVITIES: CPT | Performed by: OCCUPATIONAL THERAPIST

## 2024-10-08 PROCEDURE — 97112 NEUROMUSCULAR REEDUCATION: CPT | Performed by: OCCUPATIONAL THERAPIST

## 2024-10-08 NOTE — PROGRESS NOTES
Occupational Therapy Daily Treatment Note   [address] [place] [address]    Patient: Shahida Shabazz   : 1980  Referring practitioner: ELAINE Peña  Date of Initial Visit: Type: THERAPY  Noted: 9/3/2024  Today's Date: 10/8/2024  Patient seen for 11 sessions    ICD-10-CM ICD-9-CM   1. De Quervain's tenosynovitis  M65.4 727.04   2. Cyst of joint of hand, right  M25.841 719.84          Shahida Shabazz reports I am feeling something pinching in dorsum of R wrist.  Able to rub it out to reduce pain.       Functional status  Able to lift items except the heavy items.     Objective   See Exercise, Manual, and Modality Logs for complete treatment.   Right Wrist   Wrist flexion: 85 degrees   Wrist extension: 65 degrees   Radial deviation: 25 degrees   Ulnar deviation: 25 degrees         R thumb   Ext 60  ABD 65  0-55  0-60     Strength/Myotome Testing   Right Wrist/Hand    (2nd hand position)   Right  strength (2nd hand position) 65 lbs       Assessment/Plan    Having some popping at the wrist with random movements this date.  Pt reports it is worse when thumb is bent.  Points to DRUJ joint.     Cont per POC           Timed:  Manual Therapy:    0     mins  79528;  Therapeutic Exercise:    10     mins  67421;  Therapeutic Activity:    10     mins  96066;     Neuromuscular Jonathan:    10    mins  47260;    Ultrasound:     0     mins  58810;    Electrical Stimulation:    0     mins  89098;    Untimed:  Electrical Stimulation:    0     mins  77771 ( );  Fluidotherapy:        0    mins  53848  Dry Needlin    mins      Timed Treatment:   30   mins   Total Treatment:     30   mins    OT SIGNATURE: JAEL Spear, OTR/L, CHT     Electronically signed    [place] LICENSE: [id number]

## 2024-10-10 ENCOUNTER — TREATMENT (OUTPATIENT)
Dept: PHYSICAL THERAPY | Facility: CLINIC | Age: 44
End: 2024-10-10
Payer: COMMERCIAL

## 2024-10-10 DIAGNOSIS — M25.841 CYST OF JOINT OF HAND, RIGHT: ICD-10-CM

## 2024-10-10 DIAGNOSIS — M65.4 DE QUERVAIN'S TENOSYNOVITIS: Primary | ICD-10-CM

## 2024-10-10 NOTE — PROGRESS NOTES
Occupational Therapy Daily Treatment Note   [address] [place] [address]    Patient: Shahida Shabazz   : 1980  Referring practitioner: ELAINE Peña  Date of Initial Visit: Type: THERAPY  Noted: 9/3/2024  Today's Date: 10/10/2024  Patient seen for 12 sessions    ICD-10-CM ICD-9-CM   1. De Quervain's tenosynovitis  M65.4 727.04   2. 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  Pt reports she has been hurting ever since.       Functional status lifting causing pain still    Objective   See Exercise, Manual, and Modality Logs for complete treatment.   Focused on continued strengthening and desensitization    Assessment/Plan    Cont skilled OT for neuro re-ed    Cont per POC           Timed:  Manual Therapy:    0     mins  99351;  Therapeutic Exercise:    10     mins  38243;  Therapeutic Activity:    10     mins  30787;     Neuromuscular Jonathan:    10    mins  79575;    Ultrasound:     0     mins  62095;    Electrical Stimulation:    0     mins  51185;    Untimed:  Electrical Stimulation:    0     mins  50762 ( );  Fluidotherapy:        0    mins  37893  Dry Needlin    mins  45705    Timed Treatment:   30   mins   Total Treatment:     30   mins    OT SIGNATURE: JAEL Spear, OTR/L, CHT     Electronically signed    [place] LICENSE: [id number]

## 2024-10-15 ENCOUNTER — TREATMENT (OUTPATIENT)
Dept: PHYSICAL THERAPY | Facility: CLINIC | Age: 44
End: 2024-10-15
Payer: COMMERCIAL

## 2024-10-15 DIAGNOSIS — M65.4 DE QUERVAIN'S TENOSYNOVITIS: Primary | ICD-10-CM

## 2024-10-15 DIAGNOSIS — M25.841 CYST OF JOINT OF HAND, RIGHT: ICD-10-CM

## 2024-10-15 PROCEDURE — 97110 THERAPEUTIC EXERCISES: CPT | Performed by: OCCUPATIONAL THERAPIST

## 2024-10-15 PROCEDURE — 97112 NEUROMUSCULAR REEDUCATION: CPT | Performed by: OCCUPATIONAL THERAPIST

## 2024-10-15 NOTE — PROGRESS NOTES
Occupational Therapy Daily Treatment Note   [address] [place] [address]    Patient: Shahida Shabazz   : 1980  Referring practitioner: ELAINE Peña  Date of Initial Visit: Type: THERAPY  Noted: 9/3/2024  Today's Date: 10/15/2024  Patient seen for 13 sessions    ICD-10-CM ICD-9-CM   1. De Quervain's tenosynovitis  M65.4 727.04   2. Cyst of joint of hand, right  M25.841 719.84          Shahida Shabazz reports I am still having the popping and it is painful.  It  popped this morning making my bed.       Functional status lifting at work with assistance for heavy items    Objective   See Exercise, Manual, and Modality Logs for complete treatment.   Increased lifting to #5 with good tolerance this date. Increased pushing and pulling with no issues this date.     Assessment/Plan    Cont to progress pushing and pulling strengthening    Cont per POC           Timed:  Manual Therapy:    0     mins  63112;  Therapeutic Exercise:    10     mins  87806;  Therapeutic Activity:    10     mins  51191;     Neuromuscular Jonathan:    10    mins  64372;    Ultrasound:     0     mins  19151;    Electrical Stimulation:    0     mins  02741;    Untimed:  Electrical Stimulation:    0     mins  75175 ( );  Fluidotherapy:        0    mins  99499  Dry Needlin    mins      Timed Treatment:   30   mins   Total Treatment:     30   mins    OT SIGNATURE: JAEL Spear, OTR/L, CHT     Electronically signed    [place] LICENSE: [id number]

## 2024-10-22 ENCOUNTER — TREATMENT (OUTPATIENT)
Dept: PHYSICAL THERAPY | Facility: CLINIC | Age: 44
End: 2024-10-22
Payer: COMMERCIAL

## 2024-10-22 DIAGNOSIS — M25.841 CYST OF JOINT OF HAND, RIGHT: ICD-10-CM

## 2024-10-22 DIAGNOSIS — M65.4 DE QUERVAIN'S TENOSYNOVITIS: Primary | ICD-10-CM

## 2024-10-22 PROCEDURE — 97112 NEUROMUSCULAR REEDUCATION: CPT | Performed by: OCCUPATIONAL THERAPIST

## 2024-10-22 PROCEDURE — 97110 THERAPEUTIC EXERCISES: CPT | Performed by: OCCUPATIONAL THERAPIST

## 2024-10-22 NOTE — PROGRESS NOTES
Occupational Therapy Daily Treatment Note   [address] [place] [address]    Patient: Shahida Shabazz   : 1980  Referring practitioner: ELAINE Peña  Date of Initial Visit: Type: THERAPY  Noted: 9/3/2024  Today's Date: 10/22/2024  Patient seen for 14 sessions    ICD-10-CM ICD-9-CM   1. De Quervain's tenosynovitis  M65.4 727.04   2. 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 Discussed with patient transition to HEP.  Pt verbalizes understanding of HEP and list given to continue strengthening at gym.     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   Right Wrist   Wrist flexion: 80 degrees   Wrist extension: 70 degrees   Radial deviation: 25 degrees   Ulnar deviation: 25 degrees         R thumb   Ext 60  ABD 65  0-60  0-65     #70    Assessment/Plan  Plan Goals: 1. The patient complains of pain in the R wrist                  LTG 1: 12 weeks:  The patient will report a pain rating of 0/10 or better in order to improve sleep quality and tolerance to performance of activities of daily living.                                  STATUS:  MET                  STG 1a: 4weeks:  The patient will report a pain rating of 4/10 or better.                                   STATUS:  MET  2. The patient has limited ROM of the R wrist                   LTG 2: 12 weeks:  The patient will demonstrate 140 degrees of R wrist MONSIVAIS to allow the patient to WB.                                  STATUS:  MET                  STG 2a: 4 weeks:  The patient will demonstrate 110 degrees of R wrist MONSIVAIS.                                  STATUS:  MET                           3. The patient has limited strength of the R UE.                   LTG 3: 12 weeks:  The patient will demonstrate 50# in order to return to work .                                  STATUS: MET                  STG 3a: 4 weeks:  The patient will demonstrate tolerance to light strengthening without adverse reaction.                                  STATUS: MET  4. Carrying, Moving, and Handling Objects Functional Limitation                                    LTG 4: 12 weeks:  The patient will demonstrate 0% limitation by achieving a score of 11 on the Quick DASH.                                  STATUS:  MET                  STG 4a: 4 weeks:  The patient will demonstrate 60-79% limitation by achieving a score of 38 on the Quick DASH.                                    STATUS:  met  D/C to HEP           Timed:  Manual Therapy:    0     mins  57302;  Therapeutic Exercise:    10     mins  61937;  Therapeutic Activity:    10     mins  78627;     Neuromuscular Jonathan:    10    mins  35427;    Ultrasound:     0     mins  06174;    Electrical Stimulation:    0     mins  52536;    Untimed:  Electrical Stimulation:    0     mins  89181 ( );  Fluidotherapy:        0    mins  65120  Dry Needlin    mins  34258    Timed Treatment:   30   mins   Total Treatment:     30   mins    OT SIGNATURE: JAEL Spear, OTR/L, CHT     Electronically signed    [place] LICENSE: [id number]

## 2024-11-06 ENCOUNTER — OFFICE VISIT (OUTPATIENT)
Dept: ORTHOPEDIC SURGERY | Facility: CLINIC | Age: 44
End: 2024-11-06
Payer: COMMERCIAL

## 2024-11-06 VITALS
SYSTOLIC BLOOD PRESSURE: 100 MMHG | HEIGHT: 64 IN | OXYGEN SATURATION: 98 % | WEIGHT: 158 LBS | HEART RATE: 78 BPM | DIASTOLIC BLOOD PRESSURE: 67 MMHG | BODY MASS INDEX: 26.98 KG/M2

## 2024-11-06 DIAGNOSIS — Z47.89 AFTERCARE FOLLOWING SURGERY OF THE MUSCULOSKELETAL SYSTEM: Primary | ICD-10-CM

## 2024-11-06 PROCEDURE — 99024 POSTOP FOLLOW-UP VISIT: CPT

## 2024-11-06 NOTE — PATIENT INSTRUCTIONS
Discussed treatment plan of care with patient.    Patient continue with home exercise program.  Continue range of motion exercises as well as strengthening.  Gradually resume normal activities as tolerated.    Follow-up as needed for worsening symptoms or failure to improve.  Call for questions or concerns.

## 2024-11-06 NOTE — PROGRESS NOTES
"Chief Complaint  Follow-up of the Right Wrist    Subjective      Shahida Shabazz presents to [facility] for follow-up of right de Quervain's release and cyst excision with Dr. Adams on 8/15/2024.  Patient had been doing OT with [facility] and graduated from physical therapy October 22.  She does note a popping in the wrist since surgery but notes that it is improving.    Objective   Allergies   Allergen Reactions    Morphine Itching     Widespread pruritis    Penicillins Other (See Comments)     Yeast infection        Vital Signs:   /67   Pulse 78   Ht 162.6 cm (64.02\")   Wt 71.7 kg (158 lb)   SpO2 98%   BMI 27.11 kg/m²       Physical Exam    Constitutional: Awake, alert. Well nourished appearance.    Integumentary: Warm, dry, intact. No obvious rashes.    HENT: Atraumatic, normocephalic.   Respiratory: Non labored respirations .   Cardiovascular: Intact peripheral pulses.    Psychiatric: Normal mood and affect. A&O X3    Ortho Exam  Right wrist: Incisions are completely healed well-approximated.  There is no redness, drainage or tenderness to touch.  Full range of motion with flexion, extension, supination pronation.  She is able to make a tight fist.  Thumb to finger opposition is intact.  Capillary refill time is brisk.  Neurovascular intact.  Sensation is intact.      Imaging Results (Most Recent)       None                      Assessment and Plan   Problem List Items Addressed This Visit    None  Visit Diagnoses       De Quervain's release and cyst excision    -  Primary            Follow Up   Return if symptoms worsen or fail to improve.    Patient is a Smoker, please discuss smoking cessation options with your PCP.    Social History     Socioeconomic History    Marital status:    Tobacco Use    Smoking status: Every Day     Current packs/day: 1.00     Types: Cigarettes    Smokeless tobacco: Never    Tobacco comments:     vape   Vaping Use    Vaping status: Former "   Substance and Sexual Activity    Alcohol use: Never    Drug use: Never    Sexual activity: Defer       Patient Instructions   Discussed treatment plan of care with patient.    Patient continue with home exercise program.  Continue range of motion exercises as well as strengthening.  Gradually resume normal activities as tolerated.    Follow-up as needed for worsening symptoms or failure to improve.  Call for questions or concerns.  Patient was given instructions and counseling regarding her condition or for health maintenance advice. Please see specific information pulled into the AVS if appropriate.

## (undated) DEVICE — BNDG ELAS ECON W/CLIP 4IN 5YD LF STRL

## (undated) DEVICE — GLV SURG ULTRAFREE MAX LTX PF 8

## (undated) DEVICE — GAUZE,SPONGE,4"X4",16PLY,STRL,LF,10/TRAY: Brand: MEDLINE

## (undated) DEVICE — GLOVE,SURG,SENSICARE,ALOE,LF,PF,7: Brand: MEDLINE

## (undated) DEVICE — EXTREMITY-LF: Brand: MEDLINE INDUSTRIES, INC.

## (undated) DEVICE — INTENDED FOR TISSUE SEPARATION, AND OTHER PROCEDURES THAT REQUIRE A SHARP SURGICAL BLADE TO PUNCTURE OR CUT.: Brand: BARD-PARKER ® CARBON RIB-BACK BLADES

## (undated) DEVICE — HYPODERMIC SAFETY NEEDLE: Brand: MONOJECT

## (undated) DEVICE — DRESSING,GAUZE,XEROFORM,CURAD,1"X8",ST: Brand: CURAD

## (undated) DEVICE — UNDERCAST PADDING: Brand: DEROYAL

## (undated) DEVICE — BNDG ESMARK 4IN 12FT LF STRL BLU

## (undated) DEVICE — BLD OPTH BEAVER/MINIBLADE STR 180DEG DBL/BVL SS

## (undated) DEVICE — GLOVE,SURG,SENSICARE SLT,LF,PF,7: Brand: MEDLINE

## (undated) DEVICE — SUT ETHLN 4/0 FS2 18IN 662H

## (undated) DEVICE — STERILE POLYISOPRENE POWDER-FREE SURGICAL GLOVES: Brand: PROTEXIS

## (undated) DEVICE — APPL CHLORAPREP HI/LITE 26ML ORNG